# Patient Record
Sex: FEMALE | Race: WHITE
[De-identification: names, ages, dates, MRNs, and addresses within clinical notes are randomized per-mention and may not be internally consistent; named-entity substitution may affect disease eponyms.]

---

## 2019-04-03 ENCOUNTER — HOSPITAL ENCOUNTER (EMERGENCY)
Dept: HOSPITAL 47 - EC | Age: 74
Discharge: HOME | End: 2019-04-03
Payer: MEDICARE

## 2019-04-03 VITALS
RESPIRATION RATE: 20 BRPM | SYSTOLIC BLOOD PRESSURE: 153 MMHG | TEMPERATURE: 98.1 F | DIASTOLIC BLOOD PRESSURE: 79 MMHG | HEART RATE: 75 BPM

## 2019-04-03 DIAGNOSIS — Z88.2: ICD-10-CM

## 2019-04-03 DIAGNOSIS — L30.9: Primary | ICD-10-CM

## 2019-04-03 PROCEDURE — 99282 EMERGENCY DEPT VISIT SF MDM: CPT

## 2019-04-03 NOTE — ED
Skin/Abscess/FB HPI





- General


Chief complaint: Extremity Problem,Nontraumatic


Stated complaint: Sore on foot


Time Seen by Provider: 19 05:32


Source: patient


Mode of arrival: ambulatory


Limitations: no limitations





- History of Present Illness


Initial comments: 





This patient is a 73-year-old woman who presents to be evaluated for a rash to 

the right foot that is been present for about 2 weeks.  The patient states that 

she initially noted some itching and redness to the medial aspect of her foot.  

She states that she has been trying to treat this at home using alcohol wipes.  

She denies any systemic symptoms, including no fever or chills, chest pain, 

palpitations, dyspnea.  She states that over the past few days the rash seems to

be burning and cracking.


MD complaint: rash


Onset/Timin


-: week(s)


Tetanus Up to Date: no


Location: R foot


Severity: moderate


Quality: burning


Consistency: constant


Improves with: none


Worsens with: none


Associated symptoms: itching





- Related Data


                                  Previous Rx's











 Medication  Instructions  Recorded


 


Cephalexin [Keflex] 500 mg PO Q6HR #28 cap 19


 


Triamcinolone 0.1% Cream [Kenalog 1 applicatio TOPICAL BID #1 tube 19





0.1% Cream]  











                                    Allergies











Allergy/AdvReac Type Severity Reaction Status Date / Time


 


Sulfa (Sulfonamide Allergy  Rash/Hives Verified 19 05:02





Antibiotics)     














Review of Systems


ROS Statement: 


Those systems with pertinent positive or pertinent negative responses have been 

documented in the HPI.





ROS Other: All systems not noted in ROS Statement are negative.


Constitutional: Denies: fever, chills


Respiratory: Denies: cough, dyspnea


Cardiovascular: Denies: chest pain, palpitations


Skin: Reports: as per HPI, rash


Neurological: Denies: weakness, numbness





Past Medical History


Past Medical History: No Reported History


History of Any Multi-Drug Resistant Organisms: None Reported


Past Surgical History: No Surgical Hx Reported


Past Psychological History: No Psychological Hx Reported


Smoking Status: Never smoker


Past Alcohol Use History: Rare


Past Drug Use History: Marijuana





General Exam


Limitations: no limitations


General appearance: alert, in no apparent distress


Head exam: Present: atraumatic, normocephalic


Respiratory exam: Present: normal lung sounds bilaterally.  Absent: respiratory 

distress, wheezes, rales, rhonchi, stridor


Cardiovascular Exam: Present: regular rate, normal rhythm, normal heart sounds


Skin exam: Present: warm, dry, rash (Patient has an area of dermatitis to the 

medial aspect of the right foot and onto the dorsum.  There is no abnormal 

erythema or warmth.  No evident infection.).  Absent: erythema, vesicles, 

petechiae





Course


                                   Vital Signs











  19





  04:58


 


Temperature 98.1 F


 


Pulse Rate 75


 


Respiratory 20





Rate 


 


Blood Pressure 153/79


 


O2 Sat by Pulse 97





Oximetry 














Disposition


Clinical Impression: 


 Dermatitis





Disposition: HOME SELF-CARE


Condition: Good


Instructions (If sedation given, give patient instructions):  Dermatitis (ED)


Prescriptions: 


Cephalexin [Keflex] 500 mg PO Q6HR #28 cap


Triamcinolone 0.1% Cream [Kenalog 0.1% Cream] 1 applicatio TOPICAL BID #1 tube


Is patient prescribed a controlled substance at d/c from ED?: No


Referrals: 


None,Stated [Primary Care Provider] - 1-2 days


Jonas Turner MD [STAFF PHYSICIAN] - 1-2 days